# Patient Record
(demographics unavailable — no encounter records)

---

## 2025-01-02 NOTE — ASSESSMENT
[FreeTextEntry1] : This is a gentleman who comes to the office with a complaint that he has 2 groin hernias.  I asked him how he knew he had 2 hernias and he stated he has 2 lumps in both groins.  Upon further questioning this gentleman he makes it very clear that when he coughs or sneezes or does Valsalva maneuvers the hernia on the left is the symptomatic hernia.  He is status post an open right inguinal hernia repair [facility] about 5 to 6 years ago.  He does feel he has a bulge in both groins but the one on the left is significantly larger.  He has had no bouts of nausea or vomiting has had no change in bowel habits.  Physical examination patient examined erect and supine position.  He has a scar in the right groin.  There is protrusion with Valsalva maneuvers in the right groin however there is no obvious hernia appreciated on examination.  He has an obvious large left inguinal hernia which is mostly reducible in the supine position.  Left scrotum and testicles within normal limits.  Impression/plan chronically incarcerated left inguinal hernia, right groin weakness rule out recurrent hernia: This is a 77-year-old gentleman clearly symptomatic from a large left inguinal hernia.  He is status post open repair on the right approximately 5 to 6 years ago.  At this point I do not feel much in the right groin and this patient and his daughter and I had a long conversation regarding the pros and cons of further evaluating the right side given the paucity of findings on physical examination.  Given the fact that I would approach this gentleman laparoscopically I will repair his left side and at the conclusion of that repair I will examine the right side if possible.  If there is a significant amount of scar tissue the right side will not be examined otherwise I will examine the right side and if a hernia is noted it will be repaired at the same setting.  The indications alternatives and complication of procedure discussed questions answered written consent obtained the office today.

## 2025-02-20 NOTE — HISTORY OF PRESENT ILLNESS
[Preoperative Visit] : for a medical evaluation prior to surgery [Date of Surgery ___] : on [unfilled] [Scheduled Procedure ___] : a [unfilled] [Surgeon Name ___] : surgeon: [unfilled] [Stable] : Stable [FreeTextEntry1] : 78 yo male smoker (1 ppd) with COPD, DM, type 2, hypertension, and hyperlipidemia. Patient presents today for pre-operative cardiac evaluation for pending left inguinal hernia repair with Dr. Kevin Varma (surgery date TBD). Patient was initially scheduled for 2/10/25 but was cancelled by anesthesiologist who requested cardiac evaluation due to abnormal ECG. ECG on 1/13/25 reported sinus rhythm with bi-fascicular block. Patient denies chest pain, dyspnea, palpitations, syncope, edema, melena, hematochezia, or hematemesis. He uses his treadmill for 1-1.5 miles without significant exertional complaints and can go up several flights of stairs without limiting symptoms.

## 2025-02-20 NOTE — PHYSICAL EXAM
- continue ISS  -accuchecks in last 24 hours look good     [Well Developed] : well developed [Well Nourished] : well nourished [No Acute Distress] : no acute distress [Normal Conjunctiva] : normal conjunctiva [Normal Venous Pressure] : normal venous pressure [No Carotid Bruit] : no carotid bruit [Normal S1, S2] : normal S1, S2 [No Murmur] : no murmur [No Rub] : no rub [No Gallop] : no gallop [Clear Lung Fields] : clear lung fields [Good Air Entry] : good air entry [No Respiratory Distress] : no respiratory distress  [No Edema] : no edema [No Cyanosis] : no cyanosis [No Clubbing] : no clubbing [Moves all extremities] : moves all extremities [Normal Speech] : normal speech [Alert and Oriented] : alert and oriented [Normal memory] : normal memory

## 2025-02-20 NOTE — CARDIOLOGY SUMMARY
[de-identified] : 1/13/25 ECG (at [place]): Sinus rhythm, rate 66 bpm, RBBB+LAFB (bi-fascicular block)

## 2025-02-20 NOTE — ASSESSMENT
[FreeTextEntry1] : 76 yo male smoker (1 ppd) with DM type 2, COPD, hypertension, and hyperlipidemia. Patient is currently pending left inguinal hernia repair with Dr. Kevin Varma (surgery date TBD).  ECG from 1/13/25 was reviewed today and demonstrated sinus rhythm, rate 66 bpm, RBBB+LAFB (bi-fascicular block).  Geriatric sensitive perioperative cardiac risk index = 0.2% risk of perioperative MI or cardiac arrest Patient does not have any cardiac contraindications to pending surgery. Patient may proceed with acceptable cardiac risk and without further cardiac work-up or intervention.  BP is mildly elevated in the office today. However, he reports better controlled BP readings at home.  Will continue amlodipine 5 mg po daily and valsartan 160 mg po daily at this time. Patient was instructed to monitor BP over the next week and keep a log of BP readings in AM, mid-day, and evening. Patient to call if BP readings are persistently elevated and will adjust meds.  LDL 81 per 12/2024 labs. Will continue atorvastatin 10 mg po daily for hyperlipidemia management. Will order coronary calcium score to screen for CAD. Pending review of test results, will determine if further cardiac testing is needed and to determine if higher intensity statin therapy is warranted.

## 2025-02-20 NOTE — PHYSICAL EXAM
[Well Developed] : well developed [Well Nourished] : well nourished [No Acute Distress] : no acute distress [Normal Conjunctiva] : normal conjunctiva [Normal Venous Pressure] : normal venous pressure [No Carotid Bruit] : no carotid bruit [Normal S1, S2] : normal S1, S2 [No Murmur] : no murmur [No Rub] : no rub [No Gallop] : no gallop [Clear Lung Fields] : clear lung fields [Good Air Entry] : good air entry [No Respiratory Distress] : no respiratory distress  [No Edema] : no edema [No Cyanosis] : no cyanosis [No Clubbing] : no clubbing [Moves all extremities] : moves all extremities [Normal Speech] : normal speech [Alert and Oriented] : alert and oriented [Normal memory] : normal memory

## 2025-02-20 NOTE — CARDIOLOGY SUMMARY
[de-identified] : 1/13/25 ECG (at [place]): Sinus rhythm, rate 66 bpm, RBBB+LAFB (bi-fascicular block)

## 2025-03-18 NOTE — ASSESSMENT
[FreeTextEntry1] : Patient presents today for follow-up after bilateral lap inguinal hernia repair patient has no complaints at this time and happy with results.  Physical examination incisions healed nicely there is no evidence of infection hematoma seroma or ecchymosis he has no evidence recurrence bilaterally scrotum and testicles within normal limits testicles nontender bilaterally.  Impression/plan status post bilateral lap inguinal hernia repair patient doing surgically no acute findings patient reassured and will return to all levels of activity in 1 week's time follow-up with me on appearing basis